# Patient Record
Sex: MALE | Race: OTHER | HISPANIC OR LATINO
[De-identification: names, ages, dates, MRNs, and addresses within clinical notes are randomized per-mention and may not be internally consistent; named-entity substitution may affect disease eponyms.]

---

## 2020-04-26 ENCOUNTER — MESSAGE (OUTPATIENT)
Age: 40
End: 2020-04-26

## 2020-05-06 ENCOUNTER — APPOINTMENT (OUTPATIENT)
Dept: DISASTER EMERGENCY | Facility: CLINIC | Age: 40
End: 2020-05-06

## 2020-05-06 LAB
SARS-COV-2 IGG SERPL IA-ACNC: <0.1 INDEX
SARS-COV-2 IGG SERPL QL IA: NEGATIVE

## 2025-01-07 ENCOUNTER — OFFICE VISIT (OUTPATIENT)
Dept: URGENT CARE | Age: 45
End: 2025-01-07
Payer: COMMERCIAL

## 2025-01-07 VITALS
HEIGHT: 72 IN | OXYGEN SATURATION: 98 % | BODY MASS INDEX: 30.48 KG/M2 | HEART RATE: 82 BPM | SYSTOLIC BLOOD PRESSURE: 125 MMHG | TEMPERATURE: 97.8 F | WEIGHT: 225 LBS | DIASTOLIC BLOOD PRESSURE: 75 MMHG

## 2025-01-07 DIAGNOSIS — B02.9 HERPES ZOSTER WITHOUT COMPLICATION: Primary | ICD-10-CM

## 2025-01-07 PROCEDURE — 99203 OFFICE O/P NEW LOW 30 MIN: CPT | Performed by: PHYSICIAN ASSISTANT

## 2025-01-07 PROCEDURE — 3008F BODY MASS INDEX DOCD: CPT | Performed by: PHYSICIAN ASSISTANT

## 2025-01-07 RX ORDER — VALACYCLOVIR HYDROCHLORIDE 1 G/1
1000 TABLET, FILM COATED ORAL 3 TIMES DAILY
Qty: 21 TABLET | Refills: 0 | Status: SHIPPED | OUTPATIENT
Start: 2025-01-07 | End: 2025-01-14

## 2025-01-07 ASSESSMENT — PAIN SCALES - GENERAL: PAINLEVEL_OUTOF10: 2

## 2025-01-07 NOTE — PROGRESS NOTES
Subjective   Patient ID: Roberto Brar is a 44 y.o. male. They present today with a chief complaint of Rash (Patient c/o rash on left back side. ).    History of Present Illness  Patient is a pleasant 44-year-old white male, no significant past medical history, presented to clinic with complaint of rash.  Patient is reporting approximately 3 to 4-day history of a rash that began on his left gluteus is now spreading down his left lower extremity.  States it is painful and burning.  He does endorse history of shingles and states this feels similar.  He denies any drainage.  No trauma or injury.  He has not tried anything at home for his symptoms other than ibuprofen that does provide some relief to the pain.  He denies any saddle paresthesia or loss of bowel or bladder control.  No back pain.  No further complaints at this time.        Rash        Past Medical History  Allergies as of 01/07/2025    (No Known Allergies)       (Not in a hospital admission)         No past medical history on file.    No past surgical history on file.         Review of Systems  Review of Systems   Skin:  Positive for rash.                                  Objective    Vitals:    01/07/25 1516   BP: 125/75   Pulse: 82   Temp: 36.6 °C (97.8 °F)   SpO2: 98%   Weight: 102 kg (225 lb)   Height: 1.829 m (6')     No LMP for male patient.    Physical Exam  General: Vitals Noted. No distress. Normocephalic.     HEENT: TMs normal, EOMI, normal conjunctiva, patent nares, Normal OP    Neck: Supple with no adenopathy.     Cardiac: Regular Rate and Rhythm. No murmur.     Pulmonary: Equal breath sounds bilaterally. No wheezes, rhonchi, or rales.    Abdomen: Soft, non-tender, with normal bowel sounds.     Musculoskeletal: Moves all extremities, no effusion, no edema.     Skin: There is a rash consisting of clusters of vesicular like lesions extending from just to the left of the sacrum into the left gluteus extending down the left lower extremity in a  dermatomal distribution.  There is no associated induration warmth or drainage.  Otherwise no obvious rashes.    Procedures    Point of Care Test & Imaging Results from this visit    No results found.    Diagnostic study results (if any) were reviewed by Hakan Campbell PA-C.    Assessment/Plan   Allergies, medications, history, and pertinent labs/EKGs/Imaging reviewed by Hakan Campbell PA-C.     Medical Decision Making  Patient was seen eval in the clinic with complaint of rash.  On exam patient is nontoxic very well-appearing respite comfortably no acute distress.  Vital signs are stable, afebrile.  Chest clear, regular, belly is diffusely soft and nontender peer evaluation of the skin as above.  Rash is very consistent with shingles given the dermatomal distribution and appearance of the rash.  Will place on valacyclovir 1 g 3 times daily for the next 7 days.  Advised about close with his primary care physician the next week.  Discharged home at this time.  Reviewed my impression, plan, strict return versus report to ED precautions with the patient.  He expresses understanding and agreement plan of care.    Orders and Diagnoses  Diagnoses and all orders for this visit:  Herpes zoster without complication        Medical Admin Record      Follow Up Instructions  No follow-ups on file.    Patient disposition: Home    Electronically signed by Haakn Campbell PA-C  3:23 PM

## 2025-01-07 NOTE — PROGRESS NOTES
Subjective   Patient ID: Roberto Brar is a 44 y.o. male. They present today with a chief complaint of Rash (Patient c/o rash on left back side. ).    History of Present Illness  HPI    Past Medical History  Allergies as of 01/07/2025    (No Known Allergies)       (Not in a hospital admission)       No past medical history on file.    No past surgical history on file.         Review of Systems  Review of Systems                               Objective    Vitals:    01/07/25 1516   BP: 125/75   Pulse: 82   Temp: 36.6 °C (97.8 °F)   SpO2: 98%   Weight: 102 kg (225 lb)   Height: 1.829 m (6')     No LMP for male patient.    Physical Exam    Procedures    Point of Care Test & Imaging Results from this visit  No results found for this visit on 01/07/25.   No results found.    Diagnostic study results (if any) were reviewed by JEFF Perez.    Assessment/Plan   Allergies, medications, history, and pertinent labs/EKGs/Imaging reviewed by JEFF Perez.     Medical Decision Making  ***    Orders and Diagnoses  There are no diagnoses linked to this encounter.    Medical Admin Record      Patient disposition: { Disposition:[id number]}    Electronically signed by JEFF Perez  3:22 PM